# Patient Record
Sex: MALE | Race: WHITE | NOT HISPANIC OR LATINO | URBAN - METROPOLITAN AREA
[De-identification: names, ages, dates, MRNs, and addresses within clinical notes are randomized per-mention and may not be internally consistent; named-entity substitution may affect disease eponyms.]

---

## 2018-04-11 ENCOUNTER — APPOINTMENT (RX ONLY)
Dept: URBAN - METROPOLITAN AREA CLINIC 167 | Facility: CLINIC | Age: 73
Setting detail: DERMATOLOGY
End: 2018-04-11

## 2018-04-11 DIAGNOSIS — L259 CONTACT DERMATITIS AND OTHER ECZEMA, UNSPECIFIED CAUSE: ICD-10-CM

## 2018-04-11 PROBLEM — L30.8 OTHER SPECIFIED DERMATITIS: Status: ACTIVE | Noted: 2018-04-11

## 2018-04-11 PROCEDURE — ? TREATMENT REGIMEN

## 2018-04-11 PROCEDURE — 99202 OFFICE O/P NEW SF 15 MIN: CPT

## 2018-04-11 PROCEDURE — ? OTHER

## 2018-04-11 PROCEDURE — ? COUNSELING

## 2018-04-11 PROCEDURE — ? PRESCRIPTION

## 2018-04-11 RX ORDER — TRIAMCINOLONE ACETONIDE 1 MG/G
CREAM TOPICAL BID
Qty: 1 | Refills: 2 | Status: ERX | COMMUNITY
Start: 2018-04-11

## 2018-04-11 RX ADMIN — TRIAMCINOLONE ACETONIDE: 1 CREAM TOPICAL at 00:00

## 2018-04-11 ASSESSMENT — LOCATION ZONE DERM
LOCATION ZONE: ARM
LOCATION ZONE: LEG

## 2018-04-11 ASSESSMENT — LOCATION DETAILED DESCRIPTION DERM
LOCATION DETAILED: RIGHT PROXIMAL DORSAL FOREARM
LOCATION DETAILED: LEFT ANTERIOR DISTAL THIGH
LOCATION DETAILED: LEFT PROXIMAL DORSAL FOREARM

## 2018-04-11 ASSESSMENT — LOCATION SIMPLE DESCRIPTION DERM
LOCATION SIMPLE: RIGHT FOREARM
LOCATION SIMPLE: LEFT FOREARM
LOCATION SIMPLE: LEFT THIGH

## 2018-04-11 NOTE — PROCEDURE: TREATMENT REGIMEN
Detail Level: Zone
Otc Regimen: Moisturize every day after showering
Samples Given: Vanicream moisturizer \\nAveeno eczema balm \\nCeraVe moisturizer\\nCetaphil hydrating lotion
Plan: Recognize and avoid triggers, dust pollens, wood work, metals \\nNo known allergies to metals, leather, rubbers
Initiate Treatment: Triamcinolone 0.1% cream Apply to affected area twice a day up to 2 weeks ( may need longer in the first month )

## 2018-04-11 NOTE — PROCEDURE: OTHER
Detail Level: Detailed
Other (Free Text): Rash on arms and a couple of spots on left thigh\\nWas given triamcinolone 0.1% cream twice a day, did not get rid of rash completely - meaning that it went away, but appeared in other areas\\nHas Econazole cream, but he is not sure what this was for \\n\\nItchy \\nMakes it hard to go to sleep \\nHas tried Benadryl, not much relief \\nNo new medications or hygiene products \\nNo fevers blisters or joint aches\\n\\nHe was previously treated for eczema and tinea\\ntoday, it is not near the feet or the buttocks\\nit is just eczema, not tinea\\n\\n
Note Text (......Xxx Chief Complaint.): This diagnosis correlates with the

## 2018-04-23 ENCOUNTER — APPOINTMENT (RX ONLY)
Dept: URBAN - METROPOLITAN AREA CLINIC 170 | Facility: CLINIC | Age: 73
Setting detail: DERMATOLOGY
End: 2018-04-23

## 2018-04-23 DIAGNOSIS — L29.89 OTHER PRURITUS: ICD-10-CM

## 2018-04-23 DIAGNOSIS — L29.8 OTHER PRURITUS: ICD-10-CM

## 2018-04-23 DIAGNOSIS — L259 CONTACT DERMATITIS AND OTHER ECZEMA, UNSPECIFIED CAUSE: ICD-10-CM

## 2018-04-23 PROBLEM — L30.8 OTHER SPECIFIED DERMATITIS: Status: ACTIVE | Noted: 2018-04-23

## 2018-04-23 PROCEDURE — ? OTHER

## 2018-04-23 PROCEDURE — 99213 OFFICE O/P EST LOW 20 MIN: CPT

## 2018-04-23 PROCEDURE — ? TREATMENT REGIMEN

## 2018-04-23 PROCEDURE — ? COUNSELING

## 2018-04-23 ASSESSMENT — LOCATION DETAILED DESCRIPTION DERM
LOCATION DETAILED: LEFT PROXIMAL DORSAL FOREARM
LOCATION DETAILED: LEFT ANTERIOR DISTAL THIGH
LOCATION DETAILED: RIGHT SUPERIOR MEDIAL UPPER BACK
LOCATION DETAILED: RIGHT PROXIMAL DORSAL FOREARM
LOCATION DETAILED: LEFT DISTAL POSTERIOR THIGH
LOCATION DETAILED: RIGHT DISTAL POSTERIOR THIGH

## 2018-04-23 ASSESSMENT — LOCATION ZONE DERM
LOCATION ZONE: TRUNK
LOCATION ZONE: LEG
LOCATION ZONE: ARM

## 2018-04-23 ASSESSMENT — LOCATION SIMPLE DESCRIPTION DERM
LOCATION SIMPLE: RIGHT POSTERIOR THIGH
LOCATION SIMPLE: RIGHT FOREARM
LOCATION SIMPLE: LEFT POSTERIOR THIGH
LOCATION SIMPLE: RIGHT UPPER BACK
LOCATION SIMPLE: LEFT FOREARM
LOCATION SIMPLE: LEFT THIGH

## 2018-04-23 NOTE — PROCEDURE: OTHER
Note Text (......Xxx Chief Complaint.): This diagnosis correlates with the
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
Detail Level: Detailed

## 2018-04-23 NOTE — PROCEDURE: TREATMENT REGIMEN
Initiate Treatment: Continue Triamcinolone 0.1% cream Apply to affected area twice a day up to 2 weeks ( may need longer in the first month )
Otc Regimen: Moisturize every day after showering
Plan: No known allergies to metals, leather, rubber, pollen, wood, etc.
Samples Given: Vanicream cream, bar soap, lite lotion given
Detail Level: Zone
Discontinue Regimen: Aveeno eczema balm
Plan: Cold compresses
Otc Regimen: Non sedating antihistamines recommended\\nBenadryl gel ok to use if not irritating

## 2018-09-04 ENCOUNTER — APPOINTMENT (RX ONLY)
Dept: URBAN - METROPOLITAN AREA CLINIC 167 | Facility: CLINIC | Age: 73
Setting detail: DERMATOLOGY
End: 2018-09-04

## 2018-09-04 DIAGNOSIS — L30.8 OTHER SPECIFIED DERMATITIS: ICD-10-CM

## 2018-09-04 DIAGNOSIS — B35.3 TINEA PEDIS: ICD-10-CM

## 2018-09-04 DIAGNOSIS — L29.8 OTHER PRURITUS: ICD-10-CM

## 2018-09-04 DIAGNOSIS — L08.9 LOCAL INFECTION OF THE SKIN AND SUBCUTANEOUS TISSUE, UNSPECIFIED: ICD-10-CM

## 2018-09-04 DIAGNOSIS — L29.89 OTHER PRURITUS: ICD-10-CM

## 2018-09-04 PROCEDURE — ? ORDER TESTS

## 2018-09-04 PROCEDURE — ? TREATMENT REGIMEN

## 2018-09-04 PROCEDURE — ? OTHER

## 2018-09-04 PROCEDURE — ? COUNSELING

## 2018-09-04 PROCEDURE — ? PRESCRIPTION

## 2018-09-04 PROCEDURE — 99213 OFFICE O/P EST LOW 20 MIN: CPT

## 2018-09-04 RX ORDER — BETAMETHASONE DIPROPIONATE 0.5 MG/G
OINTMENT TOPICAL
Qty: 1 | Refills: 2 | Status: ERX | COMMUNITY
Start: 2018-09-04

## 2018-09-04 RX ADMIN — BETAMETHASONE DIPROPIONATE: 0.5 OINTMENT TOPICAL at 21:28

## 2018-09-04 ASSESSMENT — LOCATION SIMPLE DESCRIPTION DERM
LOCATION SIMPLE: LEFT THIGH
LOCATION SIMPLE: RIGHT PRETIBIAL REGION
LOCATION SIMPLE: LEFT PRETIBIAL REGION
LOCATION SIMPLE: LEFT PLANTAR SURFACE
LOCATION SIMPLE: RIGHT THIGH
LOCATION SIMPLE: RIGHT PRETIBIAL REGION
LOCATION SIMPLE: RIGHT PLANTAR SURFACE
LOCATION SIMPLE: LEFT THIGH

## 2018-09-04 ASSESSMENT — LOCATION DETAILED DESCRIPTION DERM
LOCATION DETAILED: RIGHT PROXIMAL PRETIBIAL REGION
LOCATION DETAILED: RIGHT ANTERIOR DISTAL THIGH
LOCATION DETAILED: RIGHT DISTAL PRETIBIAL REGION
LOCATION DETAILED: LEFT DISTAL PRETIBIAL REGION
LOCATION DETAILED: RIGHT PROXIMAL PRETIBIAL REGION
LOCATION DETAILED: LEFT PLANTAR FOREFOOT OVERLYING 4TH METATARSAL
LOCATION DETAILED: LEFT ANTERIOR DISTAL THIGH
LOCATION DETAILED: RIGHT PLANTAR FOREFOOT OVERLYING 2ND METATARSAL
LOCATION DETAILED: LEFT ANTERIOR DISTAL THIGH
LOCATION DETAILED: LEFT PROXIMAL PRETIBIAL REGION

## 2018-09-04 ASSESSMENT — LOCATION ZONE DERM
LOCATION ZONE: LEG
LOCATION ZONE: FEET
LOCATION ZONE: LEG

## 2018-09-04 NOTE — PROCEDURE: TREATMENT REGIMEN
Detail Level: Zone
Otc Regimen: Lamisil cream or tinactin cream (OTC) Apply to areas twice a day for active rash.  Use 2-3 times a week for prevention.
Plan: Declines Rx for ketoconazole - will use OTC\\nCan consider po Lamisil if not helpful enough
Discontinue Regimen: Benadryl cream
Plan: Patient can use cold compresses
Initiate Treatment: Betamethasone augmented 0.5% Topical Cream Apply to affected area twice daily for moderate to severe rash for up to two weeks per month

## 2018-09-04 NOTE — PROCEDURE: OTHER
Note Text (......Xxx Chief Complaint.): This diagnosis correlates with the
Other (Free Text): Patient here for Rash on both legs, foot and right hand x one week\\nPatient has been using Triamcinolone, it does not help\\nUsed tinactin Occasionally\\n\\nconsider an ID reaction to his t. pedis (he has OM and active t. pedis)\\nWill increase steroid to betamethasone\\nBut focus on treating the t. pedis
Detail Level: Detailed
Other (Free Text): He only uses OTC tinactin on occasion when his foot itches\\nHe has active t. pedis on the L bottom, sides, and top of foot (and between the toes).  Is also on the bottom of his R foot and between his toes.\\nHas OM.
Other (Free Text): he scratches his legs a lot, so they are scabbed and crusted

## 2018-09-20 ENCOUNTER — APPOINTMENT (RX ONLY)
Dept: URBAN - METROPOLITAN AREA CLINIC 167 | Facility: CLINIC | Age: 73
Setting detail: DERMATOLOGY
End: 2018-09-20

## 2018-09-20 DIAGNOSIS — I78.8 OTHER DISEASES OF CAPILLARIES: ICD-10-CM

## 2018-09-20 DIAGNOSIS — L259 CONTACT DERMATITIS AND OTHER ECZEMA, UNSPECIFIED CAUSE: ICD-10-CM

## 2018-09-20 DIAGNOSIS — L30.8 OTHER SPECIFIED DERMATITIS: ICD-10-CM | Status: RESOLVING

## 2018-09-20 DIAGNOSIS — B35.3 TINEA PEDIS: ICD-10-CM

## 2018-09-20 DIAGNOSIS — B35.1 TINEA UNGUIUM: ICD-10-CM

## 2018-09-20 DIAGNOSIS — L29.8 OTHER PRURITUS: ICD-10-CM | Status: RESOLVED

## 2018-09-20 DIAGNOSIS — L29.89 OTHER PRURITUS: ICD-10-CM | Status: RESOLVED

## 2018-09-20 PROCEDURE — ? OTHER

## 2018-09-20 PROCEDURE — 99213 OFFICE O/P EST LOW 20 MIN: CPT

## 2018-09-20 PROCEDURE — ? COUNSELING

## 2018-09-20 PROCEDURE — ? TREATMENT REGIMEN

## 2018-09-20 ASSESSMENT — LOCATION ZONE DERM
LOCATION ZONE: FEET
LOCATION ZONE: LEG
LOCATION ZONE: ARM
LOCATION ZONE: TOENAIL

## 2018-09-20 ASSESSMENT — LOCATION DETAILED DESCRIPTION DERM
LOCATION DETAILED: LEFT PLANTAR FOREFOOT OVERLYING 4TH METATARSAL
LOCATION DETAILED: LEFT PROXIMAL PRETIBIAL REGION
LOCATION DETAILED: RIGHT PLANTAR FOREFOOT OVERLYING 2ND METATARSAL
LOCATION DETAILED: RIGHT MEDIAL DISTAL PRETIBIAL REGION
LOCATION DETAILED: LEFT PROXIMAL DORSAL FOREARM
LOCATION DETAILED: LEFT GREAT TOENAIL
LOCATION DETAILED: LEFT ANTERIOR DISTAL THIGH
LOCATION DETAILED: RIGHT PROXIMAL DORSAL FOREARM
LOCATION DETAILED: RIGHT ANTERIOR DISTAL THIGH
LOCATION DETAILED: RIGHT PROXIMAL PRETIBIAL REGION
LOCATION DETAILED: LEFT DISTAL PRETIBIAL REGION
LOCATION DETAILED: RIGHT GREAT TOENAIL

## 2018-09-20 ASSESSMENT — LOCATION SIMPLE DESCRIPTION DERM
LOCATION SIMPLE: LEFT PRETIBIAL REGION
LOCATION SIMPLE: LEFT PLANTAR SURFACE
LOCATION SIMPLE: LEFT GREAT TOE
LOCATION SIMPLE: LEFT FOREARM
LOCATION SIMPLE: RIGHT THIGH
LOCATION SIMPLE: LEFT THIGH
LOCATION SIMPLE: RIGHT PLANTAR SURFACE
LOCATION SIMPLE: RIGHT GREAT TOE
LOCATION SIMPLE: RIGHT FOREARM
LOCATION SIMPLE: RIGHT PRETIBIAL REGION

## 2018-09-20 NOTE — PROCEDURE: TREATMENT REGIMEN
Detail Level: Zone
Discontinue Regimen: betamethasone - can re-start if needed
Otc Regimen: Moisturize daily after showering to hold in moisture - use non scented creams
Samples Given: Vanicream lite
Otc Regimen: Continue tinactin cream (OTC) Apply to areas twice a day for active rash.  Use 2-3 times a week for prevention.
Continue Regimen: Triamcinolone 0.1% cream aaa twice a day x 2 weeks per month or betamethasone 0.05% cream aaa twice a day x 2 weeks per month

## 2018-09-20 NOTE — PROCEDURE: OTHER
Other (Free Text): Rash on legs much improved\\nIs just a little scaly now\\nStopped betamethasone after 2 weeks (stopped 2 days ago) - worked much better than the TAC\\nBut also started treating his t. pedis at the same time\\nNow just has some red macules (capillaritis)\\nOK to stay off betamethasone\\n\\nConsider an ID reaction to his t. pedis (he has OM and active t. pedis)\\nContinue to focus on treating the t. pedis (it is not completely clear yet)\\n\\nPatient notified that his bacterial cx was negative
Detail Level: Detailed
Note Text (......Xxx Chief Complaint.): This diagnosis correlates with the
Other (Free Text): He used to use OTC tinactin only on occasion when his foot itches\\nHe had active t. pedis on the L bottom, sides, and top of foot (and between the toes).  Is also on the bottom of his R foot and between his toes.\\nHe used tinactin for the past two weeks and his feet are improved, but not clear\\nHas OM.\\n\\nCon't to aggressively treat the t. pedis to see if it keeps away the rashes on his legs and arm
Other (Free Text): used to have a rash on the forearms\\nis starting to come back\\ncan start TAC or betamethasone\\nmay also be an id reaction to his t. pedis\\ncon't to focus on treating the t. pedis